# Patient Record
Sex: MALE | Race: WHITE | NOT HISPANIC OR LATINO | Employment: FULL TIME | URBAN - METROPOLITAN AREA
[De-identification: names, ages, dates, MRNs, and addresses within clinical notes are randomized per-mention and may not be internally consistent; named-entity substitution may affect disease eponyms.]

---

## 2020-05-12 ENCOUNTER — VIRTUAL VISIT (OUTPATIENT)
Dept: UROLOGY | Facility: CLINIC | Age: 55
End: 2020-05-12
Payer: COMMERCIAL

## 2020-05-12 DIAGNOSIS — N52.9 VASCULOGENIC ERECTILE DYSFUNCTION, UNSPECIFIED VASCULOGENIC ERECTILE DYSFUNCTION TYPE: Primary | ICD-10-CM

## 2020-05-12 PROCEDURE — 99201 ZZC OFFICE/OUTPT VISIT, NEW, LEVEL I: CPT | Mod: 95 | Performed by: UROLOGY

## 2020-05-12 RX ORDER — SILDENAFIL 100 MG/1
100 TABLET, FILM COATED ORAL DAILY PRN
Qty: 20 TABLET | Refills: 3 | Status: SHIPPED | OUTPATIENT
Start: 2020-05-12 | End: 2020-08-28

## 2020-05-12 NOTE — PROGRESS NOTES
Has erectile dysfunction.    No health issues.    Previously tried sildenafil with success.    Will prescribe 100 mg sildenafil.    If the prescription is too costly we will mail him a paper copy of the prescription so he can try a variety of pharmacies.    Total time 15 minutes

## 2020-08-28 DIAGNOSIS — N52.9 VASCULOGENIC ERECTILE DYSFUNCTION, UNSPECIFIED VASCULOGENIC ERECTILE DYSFUNCTION TYPE: ICD-10-CM

## 2020-08-28 RX ORDER — SILDENAFIL 100 MG/1
100 TABLET, FILM COATED ORAL DAILY PRN
Qty: 20 TABLET | Refills: 0 | Status: SHIPPED | OUTPATIENT
Start: 2020-08-28 | End: 2020-09-14

## 2020-08-28 NOTE — TELEPHONE ENCOUNTER
Pt LM asking for return call to discuss medications.    Contact pt @: [phone]    Denise Behrendt  [facility]

## 2020-08-28 NOTE — TELEPHONE ENCOUNTER
Requested Prescriptions   Pending Prescriptions Disp Refills     sildenafil (VIAGRA) 100 MG tablet 20 tablet 3     Sig: Take 1 tablet (100 mg) by mouth daily as needed       There is no refill protocol information for this order        Last office visit: Visit date not found with prescribing provider:  Dr. Catherine   Future Office Visit:          Pt has appt scheduled for 09/14 but needs refill of Sildenafil    Denise Behrendt  [facility]

## 2020-08-28 NOTE — TELEPHONE ENCOUNTER
Pt made appt to follow up and refill sent to cover until scheduled appt.  Kelsea TERRELL   [facility]

## 2020-09-14 ENCOUNTER — VIRTUAL VISIT (OUTPATIENT)
Dept: UROLOGY | Facility: CLINIC | Age: 55
End: 2020-09-14
Payer: COMMERCIAL

## 2020-09-14 DIAGNOSIS — N52.9 VASCULOGENIC ERECTILE DYSFUNCTION, UNSPECIFIED VASCULOGENIC ERECTILE DYSFUNCTION TYPE: ICD-10-CM

## 2020-09-14 PROCEDURE — 99207 ZZC NO BILLABLE SERVICE THIS VISIT: CPT | Mod: TEL | Performed by: UROLOGY

## 2020-09-14 RX ORDER — SILDENAFIL 100 MG/1
100 TABLET, FILM COATED ORAL DAILY PRN
Qty: 50 TABLET | Refills: 3 | Status: SHIPPED | OUTPATIENT
Start: 2020-09-14 | End: 2022-06-21

## 2020-09-14 NOTE — PROGRESS NOTES
Telephone visit    Responding well to Viagra 100 mg.    We will refill his prescription and see him back on a as needed basis.

## 2022-06-20 DIAGNOSIS — N52.9 VASCULOGENIC ERECTILE DYSFUNCTION, UNSPECIFIED VASCULOGENIC ERECTILE DYSFUNCTION TYPE: ICD-10-CM

## 2022-06-20 NOTE — TELEPHONE ENCOUNTER
Requested Prescriptions   Pending Prescriptions Disp Refills     sildenafil (VIAGRA) 100 MG tablet 50 tablet 3     Sig: Take 1 tablet (100 mg) by mouth daily as needed       There is no refill protocol information for this order        Last office visit: Visit date not found with prescribing provider:  Dr. Catherine    Future Office Visit:          Ankur Luque  [facility]

## 2022-06-21 RX ORDER — SILDENAFIL 100 MG/1
100 TABLET, FILM COATED ORAL DAILY PRN
Qty: 50 TABLET | Refills: 3 | Status: SHIPPED | OUTPATIENT
Start: 2022-06-21 | End: 2023-09-29

## 2022-06-21 NOTE — TELEPHONE ENCOUNTER
Per office visit 9/14/2020:  Responding well to Viagra 100 mg.  We will refill his prescription and see him back on a as needed basis.    To provider for consideration    Jose Manuel BOWLES RN  [facility]

## 2023-09-18 ENCOUNTER — TELEPHONE (OUTPATIENT)
Dept: UROLOGY | Facility: CLINIC | Age: 58
End: 2023-09-18
Payer: COMMERCIAL

## 2023-09-18 NOTE — TELEPHONE ENCOUNTER
[facility] Call Center    Phone Message    May a detailed message be left on voicemail: no     Reason for Call: Medication Refill Request    Has the patient contacted the pharmacy for the refill? Yes   Name of medication being requested: sildenafil (VIAGRA) 100 MG tablet   Provider who prescribed the medication: Dr Catherine  Pharmacy: [facility] [place]  Date medication is needed: Soon as possible     Action Taken: Other: [facility]    Travel Screening: Not Applicable

## 2023-09-18 NOTE — TELEPHONE ENCOUNTER
Left message for pt to call back in regards to refill request. Unable to refill per RN protocol as LOV was 9/14/2020. Pt needs to be seen for refills.   Wendy STRONG RN BSN PHN  [facility]

## 2023-09-20 NOTE — TELEPHONE ENCOUNTER
Patient is now scheduled to see Jocelyn Dc on 9/29/23 to discuss ED and address refills.  Susie Mora RN on 9/20/2023 at 1:04 PM

## 2023-09-29 ENCOUNTER — OFFICE VISIT (OUTPATIENT)
Dept: UROLOGY | Facility: CLINIC | Age: 58
End: 2023-09-29
Payer: COMMERCIAL

## 2023-09-29 VITALS
WEIGHT: 175 LBS | SYSTOLIC BLOOD PRESSURE: 148 MMHG | BODY MASS INDEX: 26.52 KG/M2 | DIASTOLIC BLOOD PRESSURE: 85 MMHG | TEMPERATURE: 97.6 F | HEART RATE: 62 BPM | HEIGHT: 68 IN | OXYGEN SATURATION: 96 %

## 2023-09-29 DIAGNOSIS — N52.9 VASCULOGENIC ERECTILE DYSFUNCTION, UNSPECIFIED VASCULOGENIC ERECTILE DYSFUNCTION TYPE: ICD-10-CM

## 2023-09-29 PROCEDURE — 99202 OFFICE O/P NEW SF 15 MIN: CPT | Performed by: STUDENT IN AN ORGANIZED HEALTH CARE EDUCATION/TRAINING PROGRAM

## 2023-09-29 RX ORDER — SILDENAFIL 100 MG/1
100 TABLET, FILM COATED ORAL DAILY PRN
Qty: 90 TABLET | Refills: 3 | Status: SHIPPED | OUTPATIENT
Start: 2023-09-29

## 2023-09-29 ASSESSMENT — PAIN SCALES - GENERAL: PAINLEVEL: NO PAIN (0)

## 2023-09-29 NOTE — NURSING NOTE
"Initial BP (!) 148/85   Pulse 62   Temp 97.6  F (36.4  C) (Tympanic)   Ht 1.715 m (5' 7.5\")   Wt 79.4 kg (175 lb)   SpO2 96%   BMI 27.00 kg/m   Estimated body mass index is 27 kg/m  as calculated from the following:    Height as of this encounter: 1.715 m (5' 7.5\").    Weight as of this encounter: 79.4 kg (175 lb). .  Sonia CHAVEZ CMA 09/29/23 8:01 AM    "

## 2023-09-29 NOTE — PROGRESS NOTES
"    UROLOGY FOLLOW-UP NOTE          Chief Complaint:   Today I had the pleasure of seeing Mr. Bowen Cardoza in follow-up for a chief complaint of erectile dysfunction.          Interval Update   Bowen Cardoza is a very pleasant 58 year old male with no significant past medical history.     Brief History: . Bowen Cardoza has followed with Dr. Catherine for erectile dysfunction. He takes sildenafil 100 mg prn.     Today's notes: He is doing well today. Sildenafil is beneficial for management of his erectile dysfunction with minimal side effects. He would like a refill.          Physical Exam:   Patient is a 58 year old  male   Vitals: Blood pressure (!) 148/85, pulse 62, temperature 97.6  F (36.4  C), temperature source Tympanic, height 1.715 m (5' 7.5\"), weight 79.4 kg (175 lb), SpO2 96 %.  General: Alert and oriented x 3, no acute distress.  Respiratory: Non-labored breathing.  Cardiac: Regular rate.           Assessment/Plan   58 year old male seen in follow up for erectile dysfunction managed with sildenafil 100 mg prn. He has no new concerns today.    Plan:  Continue sildenafil 100 mg prn for sexual activity.   Follow up in two years or prn.          Past Medical History:   No past medical history on file.         Past Surgical History:   No past surgical history on file.         Medications     Current Outpatient Medications   Medication    sildenafil (VIAGRA) 100 MG tablet    TYLENOL EX ST ARTHRITIS PAIN 500 MG OR TABS     No current facility-administered medications for this visit.            Family History:     Family History   Problem Relation Age of Onset    Diabetes Mother     Cancer Father         bone and lung            Social History:     Social History     Socioeconomic History    Marital status:      Spouse name: Not on file    Number of children: Not on file    Years of education: Not on file    Highest education level: Not on file   Occupational History    Not on file   Tobacco Use    " Smoking status: Every Day     Packs/day: 0.50     Types: Cigarettes    Smokeless tobacco: Never   Substance and Sexual Activity    Alcohol use: Yes     Comment: once month    Drug use: No    Sexual activity: Not on file   Other Topics Concern    Not on file   Social History Narrative    Not on file     Social Determinants of Health     Financial Resource Strain: Not on file   Food Insecurity: Not on file   Transportation Needs: Not on file   Physical Activity: Not on file   Stress: Not on file   Social Connections: Not on file   Interpersonal Safety: Not on file   Housing Stability: Not on file            Allergies:   Patient has no known allergies.         Review of Systems:  From intake questionnaire   Negative 14 system review except as noted on HPI, nurse's note.        LINDA TEE PA-C  Department of Urology

## 2025-04-15 DIAGNOSIS — N52.9 VASCULOGENIC ERECTILE DYSFUNCTION, UNSPECIFIED VASCULOGENIC ERECTILE DYSFUNCTION TYPE: ICD-10-CM

## 2025-04-15 RX ORDER — SILDENAFIL 100 MG/1
100 TABLET, FILM COATED ORAL DAILY PRN
Qty: 90 TABLET | Refills: 1 | Status: SHIPPED | OUTPATIENT
Start: 2025-04-15

## 2025-04-15 NOTE — TELEPHONE ENCOUNTER
Requested Prescriptions   Pending Prescriptions Disp Refills    sildenafil (VIAGRA) 100 MG tablet 90 tablet 3     Sig: Take 1 tablet (100 mg) by mouth daily as needed.       There is no refill protocol information for this order          Last office visit: 9/29/2023 ; last virtual visit: Visit date not found with prescribing provider:  Jocelyn Dc     Future Office Visit: